# Patient Record
Sex: MALE | Race: WHITE | NOT HISPANIC OR LATINO
[De-identification: names, ages, dates, MRNs, and addresses within clinical notes are randomized per-mention and may not be internally consistent; named-entity substitution may affect disease eponyms.]

---

## 2021-02-04 ENCOUNTER — TRANSCRIPTION ENCOUNTER (OUTPATIENT)
Age: 61
End: 2021-02-04

## 2022-07-23 ENCOUNTER — NON-APPOINTMENT (OUTPATIENT)
Age: 62
End: 2022-07-23

## 2022-07-24 ENCOUNTER — EMERGENCY (EMERGENCY)
Facility: HOSPITAL | Age: 62
LOS: 1 days | Discharge: HOME | End: 2022-07-24

## 2022-07-24 VITALS
DIASTOLIC BLOOD PRESSURE: 84 MMHG | TEMPERATURE: 98 F | OXYGEN SATURATION: 99 % | SYSTOLIC BLOOD PRESSURE: 137 MMHG | HEART RATE: 90 BPM

## 2022-07-24 VITALS
SYSTOLIC BLOOD PRESSURE: 175 MMHG | OXYGEN SATURATION: 99 % | TEMPERATURE: 98 F | WEIGHT: 184.97 LBS | RESPIRATION RATE: 18 BRPM | DIASTOLIC BLOOD PRESSURE: 101 MMHG | HEIGHT: 68 IN | HEART RATE: 104 BPM

## 2022-07-24 PROCEDURE — L9991: CPT

## 2023-07-17 ENCOUNTER — NON-APPOINTMENT (OUTPATIENT)
Age: 63
End: 2023-07-17

## 2023-08-16 ENCOUNTER — APPOINTMENT (OUTPATIENT)
Dept: PULMONOLOGY | Facility: CLINIC | Age: 63
End: 2023-08-16
Payer: COMMERCIAL

## 2023-08-16 ENCOUNTER — NON-APPOINTMENT (OUTPATIENT)
Age: 63
End: 2023-08-16

## 2023-08-16 VITALS
HEIGHT: 68 IN | DIASTOLIC BLOOD PRESSURE: 91 MMHG | BODY MASS INDEX: 29.4 KG/M2 | OXYGEN SATURATION: 100 % | WEIGHT: 194 LBS | HEART RATE: 91 BPM | SYSTOLIC BLOOD PRESSURE: 164 MMHG

## 2023-08-16 DIAGNOSIS — C64.9 MALIGNANT NEOPLASM OF UNSPECIFIED KIDNEY, EXCEPT RENAL PELVIS: ICD-10-CM

## 2023-08-16 DIAGNOSIS — C44.90 UNSPECIFIED MALIGNANT NEOPLASM OF SKIN, UNSPECIFIED: ICD-10-CM

## 2023-08-16 DIAGNOSIS — Z00.00 ENCOUNTER FOR GENERAL ADULT MEDICAL EXAMINATION W/OUT ABNORMAL FINDINGS: ICD-10-CM

## 2023-08-16 DIAGNOSIS — I10 ESSENTIAL (PRIMARY) HYPERTENSION: ICD-10-CM

## 2023-08-16 DIAGNOSIS — C61 MALIGNANT NEOPLASM OF PROSTATE: ICD-10-CM

## 2023-08-16 DIAGNOSIS — E78.5 HYPERLIPIDEMIA, UNSPECIFIED: ICD-10-CM

## 2023-08-16 DIAGNOSIS — T75.89XA OTHER SPECIFIED EFFECTS OF EXTERNAL CAUSES, INITIAL ENCOUNTER: ICD-10-CM

## 2023-08-16 LAB
BILIRUB UR QL STRIP: NORMAL
CLARITY UR: CLEAR
COLLECTION METHOD: NORMAL
GLUCOSE UR-MCNC: NORMAL
HCG UR QL: 0.2 EU/DL
HGB UR QL STRIP.AUTO: NORMAL
KETONES UR-MCNC: NORMAL
LEUKOCYTE ESTERASE UR QL STRIP: NORMAL
NITRITE UR QL STRIP: NORMAL
PH UR STRIP: 5.5
POCT - HEMOGLOBIN (HGB), QUANTITATIVE, TRANSCUTANEOUS: 14.8
PROT UR STRIP-MCNC: NORMAL
SP GR UR STRIP: 1.01

## 2023-08-16 PROCEDURE — 99205 OFFICE O/P NEW HI 60 MIN: CPT | Mod: 25

## 2023-08-16 PROCEDURE — 36415 COLL VENOUS BLD VENIPUNCTURE: CPT

## 2023-08-16 PROCEDURE — 88738 HGB QUANT TRANSCUTANEOUS: CPT

## 2023-08-16 PROCEDURE — 94729 DIFFUSING CAPACITY: CPT

## 2023-08-16 PROCEDURE — 94727 GAS DIL/WSHOT DETER LNG VOL: CPT

## 2023-08-16 PROCEDURE — 94060 EVALUATION OF WHEEZING: CPT

## 2023-08-16 PROCEDURE — 71046 X-RAY EXAM CHEST 2 VIEWS: CPT

## 2023-08-16 PROCEDURE — 81003 URINALYSIS AUTO W/O SCOPE: CPT | Mod: QW

## 2023-08-16 PROCEDURE — 93000 ELECTROCARDIOGRAM COMPLETE: CPT

## 2023-08-16 PROCEDURE — ZZZZZ: CPT

## 2023-08-16 NOTE — HISTORY OF PRESENT ILLNESS
[< 20 pack-years] : < 20 pack-years [TextBox_4] : [employer] certification [employer] exposure  worked  [address]  No hx COPD emphysema bronchitis  pneumonia LAI Past medical history Hypertension 2020 COVID infection  Covid vaccine x2 Medications ramipril Aspirin low-dose Over-the-counter omeprazole  Family history Father  age 56 coronary artery disease  Review of systems lower respiratory symptoms reported: Negative No cough wheeze shortness of breath chest tightness Did not report history of asthma rash COPD bronchitis pneumonia Upper respiratory symptoms all reported negative Did not report any sore throat or hoarseness nasal irritation epistaxis throat spasm sinus congestion or sinus disease No reported history of obstructive sleep apnea Cardiovascular Chest pain chest pressure chest tightness heart palpitations irregular heartbeat [facility] negative Gastrointestinal GI colonoscopy completed 2 years ago up to  date No reported reflux symptomatology Also reported positive history of hypercholesterolemia no reported medication Other reported conditions cancer prostate Ureter cancer  Renal management Guido Quinones MD Prostate management Dr Ozzie Bradshaw  No reported history of anxiety depression posttraumatic stress disorder  Former smoker less than than 20 years prior reported  Colorectal screening indicated as checked by patient  [employer] protocol Blood work should include CBC comprehensive metabolic profile nonfasting lipid profile Urine analysis Chest x-ray every 2 years test otherwise indicated EKG over the age of 40 PFT with bronchodilator. [TextBox_11] : 1/2 [YearQuit] : 1999

## 2023-08-16 NOTE — DISCUSSION/SUMMARY
[FreeTextEntry1] : [employer] [place] [employer] certification History renal cancer UroLift Prostate cancer History hypertension History GERD Recommendations Maintain up-to-date colonoscopy Vaccine protocol flu shot COVID variant pneumonia vaccine Blood work pending as per [facility] [employer] [place] protocol CBC Comprehensive metabolic profile Lipid profile Maintain up-to-date follow-up as per urology management

## 2023-08-16 NOTE — PHYSICAL EXAM
[No Acute Distress] : no acute distress [Normal Oropharynx] : normal oropharynx [II] : Mallampati Class: II [Normal Appearance] : normal appearance [Supple] : supple [No Neck Mass] : no neck mass [No JVD] : no jvd [Normal Rate/Rhythm] : normal rate/rhythm [Normal S1, S2] : normal s1, s2 [No Murmurs] : no murmurs [No Rubs] : no rubs [No Resp Distress] : no resp distress [No Acc Muscle Use] : no acc muscle use [Normal Palpation] : normal palpation [Normal Rhythm and Effort] : normal rhythm and effort [Clear to Auscultation Bilaterally] : clear to auscultation bilaterally [Normal to Percussion] : normal to percussion [No Abnormalities] : no abnormalities [Benign] : benign [Not Tender] : not tender [No HSM] : no hsm [Normal Bowel Sounds] : normal bowel sounds [Normal Gait] : normal gait [No Clubbing] : no clubbing [No Cyanosis] : no cyanosis [No Edema] : no edema [FROM] : FROM [Normal Color/ Pigmentation] : normal color/ pigmentation [No Focal Deficits] : no focal deficits [Oriented x3] : oriented x3 [Normal Affect] : normal affect

## 2023-08-16 NOTE — PROCEDURE
[FreeTextEntry1] : Blood draw  Chest x-ray PA lateral August 16, 2023 Cardiac size normal Clear lung fields No parenchymal infiltrate pleural effusion dominant pulmonary nodules Soft tissue bony structures unremarkable Pam mediastinum unremarkable Overall impression clear lungs  Pulmonary function data August 16, 2023 Spirometry normal No bronchodilator response at FEV1 Lung volumes normal TLC 97% predicted No air trapping Diffusion normal range 95% predicted Hemoglobin 14.8  EKG 4016 2023 Normal sinus rhythm No acute changes

## 2023-08-16 NOTE — REVIEW OF SYSTEMS
[Negative] : Neurologic [Anxiety] : no anxiety [Panic Attacks] : no panic attacks [Thyroid Problem] : no thyroid problem [TextBox_30] : HPI [TextBox_44] : HTN, HLD [TextBox_83] : Ureter cancer 2 o years prior , prostate ca up to  date [TextBox_101] : sq cell skin cancer

## 2023-08-17 ENCOUNTER — NON-APPOINTMENT (OUTPATIENT)
Age: 63
End: 2023-08-17

## 2023-08-17 DIAGNOSIS — N28.9 DISORDER OF KIDNEY AND URETER, UNSPECIFIED: ICD-10-CM

## 2023-08-17 DIAGNOSIS — E87.1 HYPO-OSMOLALITY AND HYPONATREMIA: ICD-10-CM

## 2023-08-17 LAB
ALBUMIN SERPL ELPH-MCNC: 4.8 G/DL
ALP BLD-CCNC: 67 U/L
ALT SERPL-CCNC: 17 U/L
ANION GAP SERPL CALC-SCNC: 14 MMOL/L
AST SERPL-CCNC: 27 U/L
BILIRUB SERPL-MCNC: 0.8 MG/DL
BUN SERPL-MCNC: 15 MG/DL
CALCIUM SERPL-MCNC: 10.1 MG/DL
CHLORIDE SERPL-SCNC: 96 MMOL/L
CHOLEST SERPL-MCNC: 223 MG/DL
CO2 SERPL-SCNC: 22 MMOL/L
CREAT SERPL-MCNC: 1.39 MG/DL
EGFR: 57 ML/MIN/1.73M2
GLUCOSE SERPL-MCNC: 93 MG/DL
HDLC SERPL-MCNC: 66 MG/DL
LDLC SERPL CALC-MCNC: 140 MG/DL
NONHDLC SERPL-MCNC: 157 MG/DL
POTASSIUM SERPL-SCNC: 4.9 MMOL/L
PROT SERPL-MCNC: 7.3 G/DL
SODIUM SERPL-SCNC: 132 MMOL/L
TRIGL SERPL-MCNC: 92 MG/DL